# Patient Record
Sex: MALE | Race: BLACK OR AFRICAN AMERICAN | NOT HISPANIC OR LATINO | Employment: OTHER | URBAN - METROPOLITAN AREA
[De-identification: names, ages, dates, MRNs, and addresses within clinical notes are randomized per-mention and may not be internally consistent; named-entity substitution may affect disease eponyms.]

---

## 2019-10-10 ENCOUNTER — APPOINTMENT (EMERGENCY)
Dept: RADIOLOGY | Facility: HOSPITAL | Age: 79
End: 2019-10-10
Payer: MEDICARE

## 2019-10-10 ENCOUNTER — HOSPITAL ENCOUNTER (EMERGENCY)
Facility: HOSPITAL | Age: 79
Discharge: HOME | End: 2019-10-10
Attending: EMERGENCY MEDICINE | Admitting: EMERGENCY MEDICINE
Payer: MEDICARE

## 2019-10-10 VITALS
SYSTOLIC BLOOD PRESSURE: 244 MMHG | HEART RATE: 93 BPM | DIASTOLIC BLOOD PRESSURE: 124 MMHG | TEMPERATURE: 97.5 F | OXYGEN SATURATION: 92 % | RESPIRATION RATE: 16 BRPM

## 2019-10-10 DIAGNOSIS — I10 HYPERTENSION: ICD-10-CM

## 2019-10-10 DIAGNOSIS — J44.1 COPD WITH ACUTE EXACERBATION (HCC): Primary | ICD-10-CM

## 2019-10-10 LAB
ALBUMIN SERPL BCP-MCNC: 3.9 G/DL (ref 3.5–5)
ALP SERPL-CCNC: 73 U/L (ref 46–116)
ALT SERPL W P-5'-P-CCNC: 20 U/L (ref 12–78)
ANION GAP SERPL CALCULATED.3IONS-SCNC: 12 MMOL/L (ref 4–13)
AST SERPL W P-5'-P-CCNC: 31 U/L (ref 5–45)
BASOPHILS # BLD AUTO: 0.03 THOUSANDS/ΜL (ref 0–0.1)
BASOPHILS NFR BLD AUTO: 0 % (ref 0–1)
BILIRUB SERPL-MCNC: 1.2 MG/DL (ref 0.2–1)
BUN SERPL-MCNC: 7 MG/DL (ref 5–25)
CALCIUM SERPL-MCNC: 9.2 MG/DL (ref 8.3–10.1)
CHLORIDE SERPL-SCNC: 100 MMOL/L (ref 100–108)
CO2 SERPL-SCNC: 26 MMOL/L (ref 21–32)
CREAT SERPL-MCNC: 0.75 MG/DL (ref 0.6–1.3)
EOSINOPHIL # BLD AUTO: 0.26 THOUSAND/ΜL (ref 0–0.61)
EOSINOPHIL NFR BLD AUTO: 3 % (ref 0–6)
ERYTHROCYTE [DISTWIDTH] IN BLOOD BY AUTOMATED COUNT: 12.2 % (ref 11.6–15.1)
GFR SERPL CREATININE-BSD FRML MDRD: 102 ML/MIN/1.73SQ M
GLUCOSE SERPL-MCNC: 85 MG/DL (ref 65–140)
HCT VFR BLD AUTO: 45.7 % (ref 36.5–49.3)
HGB BLD-MCNC: 14.6 G/DL (ref 12–17)
IMM GRANULOCYTES # BLD AUTO: 0.01 THOUSAND/UL (ref 0–0.2)
IMM GRANULOCYTES NFR BLD AUTO: 0 % (ref 0–2)
LYMPHOCYTES # BLD AUTO: 2.25 THOUSANDS/ΜL (ref 0.6–4.47)
LYMPHOCYTES NFR BLD AUTO: 27 % (ref 14–44)
MCH RBC QN AUTO: 31.1 PG (ref 26.8–34.3)
MCHC RBC AUTO-ENTMCNC: 31.9 G/DL (ref 31.4–37.4)
MCV RBC AUTO: 97 FL (ref 82–98)
MONOCYTES # BLD AUTO: 0.7 THOUSAND/ΜL (ref 0.17–1.22)
MONOCYTES NFR BLD AUTO: 9 % (ref 4–12)
NEUTROPHILS # BLD AUTO: 4.98 THOUSANDS/ΜL (ref 1.85–7.62)
NEUTS SEG NFR BLD AUTO: 61 % (ref 43–75)
NRBC BLD AUTO-RTO: 0 /100 WBCS
PLATELET # BLD AUTO: 146 THOUSANDS/UL (ref 149–390)
PMV BLD AUTO: 12.9 FL (ref 8.9–12.7)
POTASSIUM SERPL-SCNC: 4.5 MMOL/L (ref 3.5–5.3)
PROT SERPL-MCNC: 8.5 G/DL (ref 6.4–8.2)
RBC # BLD AUTO: 4.69 MILLION/UL (ref 3.88–5.62)
SODIUM SERPL-SCNC: 138 MMOL/L (ref 136–145)
WBC # BLD AUTO: 8.23 THOUSAND/UL (ref 4.31–10.16)

## 2019-10-10 PROCEDURE — 85025 COMPLETE CBC W/AUTO DIFF WBC: CPT | Performed by: EMERGENCY MEDICINE

## 2019-10-10 PROCEDURE — 93005 ELECTROCARDIOGRAM TRACING: CPT

## 2019-10-10 PROCEDURE — 96374 THER/PROPH/DIAG INJ IV PUSH: CPT

## 2019-10-10 PROCEDURE — 36415 COLL VENOUS BLD VENIPUNCTURE: CPT | Performed by: EMERGENCY MEDICINE

## 2019-10-10 PROCEDURE — 99285 EMERGENCY DEPT VISIT HI MDM: CPT

## 2019-10-10 PROCEDURE — 94640 AIRWAY INHALATION TREATMENT: CPT

## 2019-10-10 PROCEDURE — 94760 N-INVAS EAR/PLS OXIMETRY 1: CPT

## 2019-10-10 PROCEDURE — 99285 EMERGENCY DEPT VISIT HI MDM: CPT | Performed by: EMERGENCY MEDICINE

## 2019-10-10 PROCEDURE — 80053 COMPREHEN METABOLIC PANEL: CPT | Performed by: EMERGENCY MEDICINE

## 2019-10-10 RX ORDER — METHYLPREDNISOLONE SODIUM SUCCINATE 125 MG/2ML
125 INJECTION, POWDER, LYOPHILIZED, FOR SOLUTION INTRAMUSCULAR; INTRAVENOUS ONCE
Status: COMPLETED | OUTPATIENT
Start: 2019-10-10 | End: 2019-10-10

## 2019-10-10 RX ORDER — PREDNISONE 20 MG/1
40 TABLET ORAL DAILY
Qty: 10 TABLET | Refills: 0 | Status: SHIPPED | OUTPATIENT
Start: 2019-10-10 | End: 2019-10-15

## 2019-10-10 RX ORDER — SODIUM CHLORIDE FOR INHALATION 0.9 %
3 VIAL, NEBULIZER (ML) INHALATION ONCE
Status: COMPLETED | OUTPATIENT
Start: 2019-10-10 | End: 2019-10-10

## 2019-10-10 RX ADMIN — ISODIUM CHLORIDE 3 ML: 0.03 SOLUTION RESPIRATORY (INHALATION) at 21:26

## 2019-10-10 RX ADMIN — IPRATROPIUM BROMIDE 0.5 MG: 0.5 SOLUTION RESPIRATORY (INHALATION) at 20:39

## 2019-10-10 RX ADMIN — ALBUTEROL SULFATE 5 MG: 2.5 SOLUTION RESPIRATORY (INHALATION) at 20:38

## 2019-10-10 RX ADMIN — METHYLPREDNISOLONE SODIUM SUCCINATE 125 MG: 125 INJECTION, POWDER, FOR SOLUTION INTRAMUSCULAR; INTRAVENOUS at 21:20

## 2019-10-11 LAB
ATRIAL RATE: 96 BPM
P AXIS: 71 DEGREES
PR INTERVAL: 196 MS
QRS AXIS: 81 DEGREES
QRSD INTERVAL: 148 MS
QT INTERVAL: 400 MS
QTC INTERVAL: 505 MS
T WAVE AXIS: 62 DEGREES
VENTRICULAR RATE: 96 BPM

## 2019-10-11 PROCEDURE — 93010 ELECTROCARDIOGRAM REPORT: CPT | Performed by: INTERNAL MEDICINE

## 2019-10-11 NOTE — DISCHARGE INSTRUCTIONS
We understand that you did not wish to stay for your laboratory results or a repeat blood pressure today  Without that information, there is a chance that we could be missing a more serious problem to include damage to the heart, kidneys, or other organs  If you develop worsening or concerning symptoms including fever, chest pain, swelling, shortness or breath, or trouble urinating, go immediately to the nearest ER

## 2019-10-11 NOTE — ED NOTES
Pt insisting to leave, removed all monitoring equipment and gown, refusing to wait for labwork "because I'm sure its fine, I don't need it; I'm only here for a breathing treatment"  Pt nebulizer treatment still half-full and off  Dr Elvie Clark made aware  Pt states feels better  Dr Elvie Clark at bedside to advise patient to stay and wait for labs and patient continues to insist on going home   Refusing to recheck vitals     Jonny Glez RN  10/10/19 2548

## 2019-10-11 NOTE — ED PROVIDER NOTES
Pt Name: Luc Vincent  MRN: [id number]  [place] 1940  Age/Sex: 70 y o  male  Date of evaluation: 10/10/2019  PCP: [facility]    Chief Complaint   Patient presents with    Shortness of Breath     pt started with SOB this morning, pt with hx of COPD  denies chest pain  HPI    70 y o  male presenting with shortness of breath  Patient states he was doing yd work and over did it which caused him started wheezing and coughing  Patient states this is exactly the same as his usual COPD exacerbations of her symptoms provoked by over exerting himself  He denies fever, trauma, chest pain, abdominal pain, nausea, vomiting, diarrhea, other symptoms  HPI      Past Medical and Surgical History    Past Medical History:   Diagnosis Date    [facility]     Hypertension        Past Surgical History:   Procedure Laterality Date    JOINT REPLACEMENT         History reviewed  No pertinent family history  Social History     Tobacco Use    Smoking status: Former Smoker    Smokeless tobacco: Never Used   Substance Use Topics    Alcohol use: Never     Frequency: Never    Drug use: Never           Allergies    No Known Allergies    Home Medications    Prior to Admission medications    Not on File           Review of Systems    Review of Systems   Constitutional: Negative for appetite change, chills and diaphoresis  HENT: Negative for drooling, facial swelling, trouble swallowing and voice change  Respiratory: Positive for cough, chest tightness, shortness of breath and wheezing  Negative for apnea  Cardiovascular: Negative for chest pain and leg swelling  Gastrointestinal: Negative for abdominal distention, abdominal pain, diarrhea, nausea and vomiting  Genitourinary: Negative for dysuria and urgency  Musculoskeletal: Negative for arthralgias, back pain, gait problem and neck pain     Skin: Negative for color change, rash and wound    Neurological: Negative for seizures, speech difficulty, weakness and headaches  Psychiatric/Behavioral: Negative for agitation, behavioral problems and dysphoric mood  The patient is not nervous/anxious  All other systems reviewed and negative  Physical Exam      ED Triage Vitals   Temperature Pulse Respirations Blood Pressure SpO2   10/10/19 2001 10/10/19 2001 10/10/19 2108 10/10/19 2001 10/10/19 2001   97 5 °F (36 4 °C) 93 16 (!) 244/124 92 %      Temp Source Heart Rate Source Patient Position - Orthostatic VS BP Location FiO2 (%)   10/10/19 2001 10/10/19 2001 10/10/19 2001 10/10/19 2001 --   Oral Monitor Sitting Left arm       Pain Score       --                      Physical Exam   Constitutional: He is oriented to person, place, and time  He appears well-developed and well-nourished  He appears distressed  Mild distress   HENT:   Head: Normocephalic and atraumatic  Eyes: Pupils are equal, round, and reactive to light  Conjunctivae and EOM are normal    Neck: Normal range of motion  Neck supple  No tracheal deviation present  Cardiovascular: Normal rate, regular rhythm, normal heart sounds and intact distal pulses  No murmur heard  Pulmonary/Chest: No stridor  He is in respiratory distress  He has wheezes  He has no rales  Mild respiratory distress, dense wheezes throughout all lung fields, no focal findings  Patient still able to talk than 4 5 word phrases  Tachypneic   Abdominal: Soft  He exhibits no distension  There is no tenderness  There is no rebound and no guarding  Musculoskeletal: Normal range of motion  He exhibits no edema or deformity  Neurological: He is alert and oriented to person, place, and time  Skin: Skin is warm and dry  No rash noted  Psychiatric: He has a normal mood and affect  His behavior is normal  Judgment and thought content normal    Nursing note and vitals reviewed             Diagnostic Results  EKG Interpretation    Rate:  96 BPM  Rhythm:  Sinus rhythm with occasional PVC   Axis:  Normal   Intervals:  Right bundle branch block, QTc  505 ms  Q waves:  Normal   T waves:  Normal   ST segments:  No significant elevations or depressions     Impression:  Normal sinus rhythm with occasional PVCs and right bundle branch block but no evidence of acute ischemia      EKG for comparison:  None available    EKG interpreted by me         Labs:    Results for orders placed or performed during the hospital encounter of 10/10/19   CBC and differential   Result Value Ref Range    WBC 8 23 4 31 - 10 16 Thousand/uL    RBC 4 69 3 88 - 5 62 Million/uL    Hemoglobin 14 6 12 0 - 17 0 g/dL    Hematocrit 45 7 36 5 - 49 3 %    MCV 97 82 - 98 fL    MCH 31 1 26 8 - 34 3 pg    MCHC 31 9 31 4 - 37 4 g/dL    RDW 12 2 11 6 - 15 1 %    MPV 12 9 (H) 8 9 - 12 7 fL    Platelets 860 (L) 394 - 390 Thousands/uL    nRBC 0 /100 WBCs    Neutrophils Relative 61 43 - 75 %    Immat GRANS % 0 0 - 2 %    Lymphocytes Relative 27 14 - 44 %    Monocytes Relative 9 4 - 12 %    Eosinophils Relative 3 0 - 6 %    Basophils Relative 0 0 - 1 %    Neutrophils Absolute 4 98 1 85 - 7 62 Thousands/µL    Immature Grans Absolute 0 01 0 00 - 0 20 Thousand/uL    Lymphocytes Absolute 2 25 0 60 - 4 47 Thousands/µL    Monocytes Absolute 0 70 0 17 - 1 22 Thousand/µL    Eosinophils Absolute 0 26 0 00 - 0 61 Thousand/µL    Basophils Absolute 0 03 0 00 - 0 10 Thousands/µL   Comprehensive metabolic panel   Result Value Ref Range    Sodium 138 136 - 145 mmol/L    Potassium 4 5 3 5 - 5 3 mmol/L    Chloride 100 100 - 108 mmol/L    CO2 26 21 - 32 mmol/L    ANION GAP 12 4 - 13 mmol/L    BUN 7 5 - 25 mg/dL    Creatinine 0 75 0 60 - 1 30 mg/dL    Glucose 85 65 - 140 mg/dL    Calcium 9 2 8 3 - 10 1 mg/dL    AST 31 5 - 45 U/L    ALT 20 12 - 78 U/L    Alkaline Phosphatase 73 46 - 116 U/L    Total Protein 8 5 (H) 6 4 - 8 2 g/dL    Albumin 3 9 3 5 - 5 0 g/dL    Total Bilirubin 1 20 (H) 0 20 - 1 00 mg/dL    eGFR 102 ml/min/1 73sq m       All labs reviewed and utilized in the medical decision making process    Radiology:    No orders to display       All radiology studies independently viewed by me and interpreted by the radiologist     Procedure    Procedures        ED Course of Care and Re-Assessments  Patient refused chest x-ray that was initially ordered, states he assures just to COPD  He states he would not consent to a chest x-ray unless he did not improve with treatment  Symptoms resolved with Heart neb and methylprednisolone  Patient declined any further workup to include any repeat vital sign measurements, discussed his elevated blood pressure and patient states that he would prefer to follow up with his primary care doctor  Medications   methylPREDNISolone sodium succinate (Solu-MEDROL) injection 125 mg (125 mg Intravenous Given 10/10/19 2120)   albuterol inhalation solution 10 mg (5 mg Nebulization Given 10/10/19 2038)     And   ipratropium (ATROVENT) 0 02 % inhalation solution 1 mg (0 5 mg Nebulization Given 10/10/19 2039)     And   sodium chloride 0 9 % inhalation solution 3 mL (3 mL Nebulization Given 10/10/19 2126)           FINAL IMPRESSION    Final diagnoses:   COPD with acute exacerbation ([facility] )   Hypertension         DISPOSITION/PLAN    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  Vital signs remarkable for hypertension and mild hypoxemia, examination remarkable for wheezing  Symptoms resolved and heart rate and respiratory rate improved after Heart neb and steroids  Patient declined any further workup and requested discharge, re-examined and found to be breathing normally, discharged strict return precautions, follow up primary care doctor    Time reflects when diagnosis was documented in both MDM as applicable and the Disposition within this note     Time User Action Codes Description Comment    10/10/2019 10:06 PM Gerri Cotton Add [J44 1] COPD with acute exacerbation ([facility] ) 10/10/2019 10:06 PM Yasmani Stanley Add [I10] Hypertension       ED Disposition     ED Disposition Condition Date/Time Comment    Discharge Stable Thu Oct 10, 2019 10:06 PM No Carter discharge to home/self care  Follow-up Information     Follow up With Specialties Details Why Contact Info    Collin Gamboa  Call in 1 day To discuss your symptoms and schedule close followup  Discuss your breathing and your elevated blood pressure [address] [place] [id number]  [phone]              PATIENT REFERRED TO:    Collin Gamboa  [address] [place] [address] Drive  [phone]    Call in 1 day  To discuss your symptoms and schedule close followup  Discuss your breathing and your elevated blood pressure      DISCHARGE MEDICATIONS:    Discharge Medication List as of 10/10/2019 10:08 PM      START taking these medications    Details   predniSONE 20 mg tablet Take 2 tablets (40 mg total) by mouth daily for 5 days, Starting Thu 10/10/2019, Until Tue 10/15/2019, Print             No discharge procedures on file           MD Giovanni Dye MD  10/10/19 2301

## 2021-02-12 DIAGNOSIS — Z23 ENCOUNTER FOR IMMUNIZATION: ICD-10-CM
